# Patient Record
Sex: FEMALE | Race: OTHER | Employment: UNEMPLOYED | URBAN - METROPOLITAN AREA
[De-identification: names, ages, dates, MRNs, and addresses within clinical notes are randomized per-mention and may not be internally consistent; named-entity substitution may affect disease eponyms.]

---
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
## 2025-03-05 ENCOUNTER — APPOINTMENT (OUTPATIENT)
Facility: HOSPITAL | Age: 20
End: 2025-03-05
Payer: COMMERCIAL

## 2025-03-05 ENCOUNTER — HOSPITAL ENCOUNTER (EMERGENCY)
Facility: HOSPITAL | Age: 20
Discharge: HOME | End: 2025-03-05
Payer: COMMERCIAL

## 2025-03-05 VITALS
TEMPERATURE: 100.2 F | DIASTOLIC BLOOD PRESSURE: 61 MMHG | OXYGEN SATURATION: 98 % | RESPIRATION RATE: 16 BRPM | SYSTOLIC BLOOD PRESSURE: 101 MMHG | HEART RATE: 85 BPM

## 2025-03-05 DIAGNOSIS — J10.1 INFLUENZA A: Primary | ICD-10-CM

## 2025-03-05 LAB
ALBUMIN SERPL-MCNC: 3.5 G/DL (ref 3.4–5)
ALBUMIN/GLOB SERPL: 0.8 (ref 0.8–1.7)
ALP SERPL-CCNC: 66 U/L (ref 45–117)
ALT SERPL-CCNC: 20 U/L (ref 13–56)
ANION GAP SERPL CALC-SCNC: 5 MMOL/L (ref 3–18)
APPEARANCE UR: CLEAR
AST SERPL-CCNC: 48 U/L (ref 10–38)
BACTERIA URNS QL MICRO: ABNORMAL /HPF
BASOPHILS # BLD: 0.01 K/UL (ref 0–0.1)
BASOPHILS NFR BLD: 0.1 % (ref 0–2)
BILIRUB SERPL-MCNC: 0.5 MG/DL (ref 0.2–1)
BILIRUB UR QL: NEGATIVE
BUN SERPL-MCNC: 7 MG/DL (ref 7–18)
BUN/CREAT SERPL: 9 (ref 12–20)
CALCIUM SERPL-MCNC: 8.6 MG/DL (ref 8.5–10.1)
CHLORIDE SERPL-SCNC: 102 MMOL/L (ref 100–111)
CO2 SERPL-SCNC: 24 MMOL/L (ref 21–32)
COLOR UR: YELLOW
CREAT SERPL-MCNC: 0.8 MG/DL (ref 0.6–1.3)
DIFFERENTIAL METHOD BLD: ABNORMAL
EOSINOPHIL # BLD: 0.01 K/UL (ref 0–0.4)
EOSINOPHIL NFR BLD: 0.1 % (ref 0–5)
EPITH CASTS URNS QL MICRO: ABNORMAL /LPF (ref 0–5)
ERYTHROCYTE [DISTWIDTH] IN BLOOD BY AUTOMATED COUNT: 11.7 % (ref 11.6–14.5)
FLUAV RNA SPEC QL NAA+PROBE: DETECTED
FLUBV RNA SPEC QL NAA+PROBE: NOT DETECTED
GLOBULIN SER CALC-MCNC: 4.4 G/DL (ref 2–4)
GLUCOSE SERPL-MCNC: 115 MG/DL (ref 74–99)
GLUCOSE UR STRIP.AUTO-MCNC: NEGATIVE MG/DL
HCG SERPL QL: NEGATIVE
HCT VFR BLD AUTO: 41.1 % (ref 35–45)
HGB BLD-MCNC: 14 G/DL (ref 12–16)
HGB UR QL STRIP: ABNORMAL
IMM GRANULOCYTES # BLD AUTO: 0.02 K/UL (ref 0–0.04)
IMM GRANULOCYTES NFR BLD AUTO: 0.2 % (ref 0–0.5)
KETONES UR QL STRIP.AUTO: 40 MG/DL
LEUKOCYTE ESTERASE UR QL STRIP.AUTO: NEGATIVE
LIPASE SERPL-CCNC: 21 U/L (ref 13–75)
LYMPHOCYTES # BLD: 0.57 K/UL (ref 0.9–3.6)
LYMPHOCYTES NFR BLD: 6.5 % (ref 21–52)
MCH RBC QN AUTO: 29.4 PG (ref 24–34)
MCHC RBC AUTO-ENTMCNC: 34.1 G/DL (ref 31–37)
MCV RBC AUTO: 86.2 FL (ref 78–100)
MONOCYTES # BLD: 0.61 K/UL (ref 0.05–1.2)
MONOCYTES NFR BLD: 6.9 % (ref 3–10)
MUCOUS THREADS URNS QL MICRO: ABNORMAL /LPF
NEUTS SEG # BLD: 7.61 K/UL (ref 1.8–8)
NEUTS SEG NFR BLD: 86.2 % (ref 40–73)
NITRITE UR QL STRIP.AUTO: NEGATIVE
NRBC # BLD: 0 K/UL (ref 0–0.01)
NRBC BLD-RTO: 0 PER 100 WBC
PH UR STRIP: 6.5 (ref 5–8)
PLATELET # BLD AUTO: 205 K/UL (ref 135–420)
PMV BLD AUTO: 9.8 FL (ref 9.2–11.8)
POTASSIUM SERPL-SCNC: 5 MMOL/L (ref 3.5–5.5)
PROT SERPL-MCNC: 7.9 G/DL (ref 6.4–8.2)
PROT UR STRIP-MCNC: 100 MG/DL
RBC # BLD AUTO: 4.77 M/UL (ref 4.2–5.3)
RBC #/AREA URNS HPF: ABNORMAL /HPF (ref 0–5)
SARS-COV-2 RNA RESP QL NAA+PROBE: NOT DETECTED
SODIUM SERPL-SCNC: 131 MMOL/L (ref 136–145)
SOURCE: ABNORMAL
SP GR UR REFRACTOMETRY: >1.03 (ref 1–1.04)
UROBILINOGEN UR QL STRIP.AUTO: 2 EU/DL (ref 0.2–1)
WBC # BLD AUTO: 8.8 K/UL (ref 4.6–13.2)
WBC URNS QL MICRO: ABNORMAL /HPF (ref 0–5)

## 2025-03-05 PROCEDURE — 71046 X-RAY EXAM CHEST 2 VIEWS: CPT

## 2025-03-05 PROCEDURE — 80053 COMPREHEN METABOLIC PANEL: CPT

## 2025-03-05 PROCEDURE — 99284 EMERGENCY DEPT VISIT MOD MDM: CPT

## 2025-03-05 PROCEDURE — 85025 COMPLETE CBC W/AUTO DIFF WBC: CPT

## 2025-03-05 PROCEDURE — 81001 URINALYSIS AUTO W/SCOPE: CPT

## 2025-03-05 PROCEDURE — 84703 CHORIONIC GONADOTROPIN ASSAY: CPT

## 2025-03-05 PROCEDURE — 83690 ASSAY OF LIPASE: CPT

## 2025-03-05 PROCEDURE — 6370000000 HC RX 637 (ALT 250 FOR IP): Performed by: PHYSICIAN ASSISTANT

## 2025-03-05 PROCEDURE — 87636 SARSCOV2 & INF A&B AMP PRB: CPT

## 2025-03-05 RX ORDER — ONDANSETRON 4 MG/1
4 TABLET, FILM COATED ORAL EVERY 8 HOURS PRN
Qty: 10 TABLET | Refills: 0 | Status: SHIPPED | OUTPATIENT
Start: 2025-03-05 | End: 2025-03-07

## 2025-03-05 RX ORDER — ACETAMINOPHEN 500 MG
1000 TABLET ORAL
Status: COMPLETED | OUTPATIENT
Start: 2025-03-05 | End: 2025-03-05

## 2025-03-05 RX ADMIN — ACETAMINOPHEN 1000 MG: 500 TABLET, FILM COATED ORAL at 16:43

## 2025-03-05 NOTE — ED PROVIDER NOTES
EMERGENCY DEPARTMENT HISTORY AND PHYSICAL EXAM      Date: 3/5/2025  Patient Name: Tess Patricia    History of Presenting Illness     Chief Complaint   Patient presents with    Cold Symptoms       History (Context): Tess Patricia is a 19 y.o. female with no significant PMHx presents ambulatory to the ED today.  Patient reports productive cough, myalgias and nonbloody vomiting began 2 days ago.  Denies known exposure to COVID or the flu.  Denies concern for pregnancy.  Denies previous abdominal surgeries.  Denies diarrhea.  Patient has not take anything for symptoms      PCP: None, None    No current facility-administered medications for this encounter.     Current Outpatient Medications   Medication Sig Dispense Refill    ondansetron (ZOFRAN-ODT) 4 MG disintegrating tablet Take 1 tablet by mouth 3 times daily as needed for Nausea or Vomiting 21 tablet 0    triamcinolone (KENALOG) 0.1 % cream Apply topically 2 times daily. 80 g 0       Past History     Past Medical History:   No past medical history on file.    Past Surgical History:  No past surgical history on file.    Family History:  No family history on file.    Social History:        Allergies:  Allergies   Allergen Reactions    Nuts [Peanut-Containing Drug Products]          Physical Exam     Vitals:    03/05/25 1615 03/05/25 1807   BP: 115/74 101/61   Pulse: (!) 111 85   Resp: 14 16   Temp: (!) 101.3 °F (38.5 °C) 100.2 °F (37.9 °C)   TempSrc:  Oral   SpO2: 99% 98%       Physical Exam  Vitals and nursing note reviewed.   Constitutional:       Appearance: Normal appearance.      Comments: Pt in NAD   HENT:      Head: Normocephalic and atraumatic.   Eyes:      General: No scleral icterus.  Cardiovascular:      Rate and Rhythm: Regular rhythm. Tachycardia present.      Pulses: No decreased pulses.   Pulmonary:      Effort: Pulmonary effort is normal.      Breath sounds: Normal breath sounds and air entry.      Comments: Lungs CTA  Not working to

## 2025-03-07 ENCOUNTER — HOSPITAL ENCOUNTER (EMERGENCY)
Facility: HOSPITAL | Age: 20
Discharge: HOME | End: 2025-03-07
Attending: EMERGENCY MEDICINE
Payer: COMMERCIAL

## 2025-03-07 VITALS
HEART RATE: 84 BPM | TEMPERATURE: 98.4 F | OXYGEN SATURATION: 98 % | SYSTOLIC BLOOD PRESSURE: 109 MMHG | WEIGHT: 117 LBS | BODY MASS INDEX: 19.97 KG/M2 | RESPIRATION RATE: 20 BRPM | DIASTOLIC BLOOD PRESSURE: 73 MMHG | HEIGHT: 64 IN

## 2025-03-07 DIAGNOSIS — R10.13 ABDOMINAL PAIN, EPIGASTRIC: Primary | ICD-10-CM

## 2025-03-07 LAB
ALBUMIN SERPL-MCNC: 3.7 G/DL (ref 3.4–5)
ALBUMIN/GLOB SERPL: 0.9 (ref 0.8–1.7)
ALP SERPL-CCNC: 70 U/L (ref 45–117)
ALT SERPL-CCNC: 18 U/L (ref 13–56)
ANION GAP SERPL CALC-SCNC: 8 MMOL/L (ref 3–18)
APPEARANCE UR: CLEAR
AST SERPL-CCNC: 21 U/L (ref 10–38)
BACTERIA URNS QL MICRO: ABNORMAL /HPF
BASOPHILS # BLD: 0.02 K/UL (ref 0–0.1)
BASOPHILS NFR BLD: 0.2 % (ref 0–2)
BILIRUB SERPL-MCNC: 0.7 MG/DL (ref 0.2–1)
BILIRUB UR QL: NEGATIVE
BUN SERPL-MCNC: 9 MG/DL (ref 7–18)
BUN/CREAT SERPL: 13 (ref 12–20)
CALCIUM SERPL-MCNC: 9.1 MG/DL (ref 8.5–10.1)
CHLORIDE SERPL-SCNC: 100 MMOL/L (ref 100–111)
CO2 SERPL-SCNC: 25 MMOL/L (ref 21–32)
COLOR UR: ABNORMAL
CREAT SERPL-MCNC: 0.69 MG/DL (ref 0.6–1.3)
DIFFERENTIAL METHOD BLD: ABNORMAL
EKG ATRIAL RATE: 91 BPM
EKG DIAGNOSIS: NORMAL
EKG P AXIS: 78 DEGREES
EKG P-R INTERVAL: 106 MS
EKG Q-T INTERVAL: 350 MS
EKG QRS DURATION: 76 MS
EKG QTC CALCULATION (BAZETT): 430 MS
EKG R AXIS: 82 DEGREES
EKG T AXIS: 57 DEGREES
EKG VENTRICULAR RATE: 91 BPM
EOSINOPHIL # BLD: 0.02 K/UL (ref 0–0.4)
EOSINOPHIL NFR BLD: 0.2 % (ref 0–5)
EPITH CASTS URNS QL MICRO: NEGATIVE /LPF (ref 0–5)
ERYTHROCYTE [DISTWIDTH] IN BLOOD BY AUTOMATED COUNT: 11.8 % (ref 11.6–14.5)
GLOBULIN SER CALC-MCNC: 4 G/DL (ref 2–4)
GLUCOSE SERPL-MCNC: 96 MG/DL (ref 74–99)
GLUCOSE UR STRIP.AUTO-MCNC: NEGATIVE MG/DL
HCG SERPL-ACNC: <1 MIU/ML (ref 0–10)
HCG UR QL: NEGATIVE
HCT VFR BLD AUTO: 42.9 % (ref 35–45)
HGB BLD-MCNC: 15 G/DL (ref 12–16)
HGB UR QL STRIP: ABNORMAL
IMM GRANULOCYTES # BLD AUTO: 0.05 K/UL (ref 0–0.04)
IMM GRANULOCYTES NFR BLD AUTO: 0.5 % (ref 0–0.5)
KETONES UR QL STRIP.AUTO: 80 MG/DL
LEUKOCYTE ESTERASE UR QL STRIP.AUTO: NEGATIVE
LIPASE SERPL-CCNC: 26 U/L (ref 13–75)
LYMPHOCYTES # BLD: 1.56 K/UL (ref 0.9–3.6)
LYMPHOCYTES NFR BLD: 16.5 % (ref 21–52)
MCH RBC QN AUTO: 29.8 PG (ref 24–34)
MCHC RBC AUTO-ENTMCNC: 35 G/DL (ref 31–37)
MCV RBC AUTO: 85.1 FL (ref 78–100)
MONOCYTES # BLD: 0.73 K/UL (ref 0.05–1.2)
MONOCYTES NFR BLD: 7.7 % (ref 3–10)
MUCOUS THREADS URNS QL MICRO: ABNORMAL /LPF
NEUTS SEG # BLD: 7.07 K/UL (ref 1.8–8)
NEUTS SEG NFR BLD: 74.9 % (ref 40–73)
NITRITE UR QL STRIP.AUTO: NEGATIVE
NRBC # BLD: 0 K/UL (ref 0–0.01)
NRBC BLD-RTO: 0 PER 100 WBC
PH UR STRIP: 5.5 (ref 5–8)
PLATELET # BLD AUTO: 211 K/UL (ref 135–420)
PMV BLD AUTO: 9.7 FL (ref 9.2–11.8)
POTASSIUM SERPL-SCNC: 4 MMOL/L (ref 3.5–5.5)
PROT SERPL-MCNC: 7.7 G/DL (ref 6.4–8.2)
PROT UR STRIP-MCNC: 100 MG/DL
RBC # BLD AUTO: 5.04 M/UL (ref 4.2–5.3)
RBC #/AREA URNS HPF: ABNORMAL /HPF (ref 0–5)
SODIUM SERPL-SCNC: 133 MMOL/L (ref 136–145)
SP GR UR REFRACTOMETRY: >1.03 (ref 1–1.04)
TROPONIN I SERPL HS-MCNC: <3 NG/L (ref 0–54)
UROBILINOGEN UR QL STRIP.AUTO: 1 EU/DL (ref 0.2–1)
WBC # BLD AUTO: 9.5 K/UL (ref 4.6–13.2)
WBC URNS QL MICRO: ABNORMAL /HPF (ref 0–5)

## 2025-03-07 PROCEDURE — 81001 URINALYSIS AUTO W/SCOPE: CPT

## 2025-03-07 PROCEDURE — 85025 COMPLETE CBC W/AUTO DIFF WBC: CPT

## 2025-03-07 PROCEDURE — 84702 CHORIONIC GONADOTROPIN TEST: CPT

## 2025-03-07 PROCEDURE — 6360000002 HC RX W HCPCS: Performed by: EMERGENCY MEDICINE

## 2025-03-07 PROCEDURE — 96374 THER/PROPH/DIAG INJ IV PUSH: CPT

## 2025-03-07 PROCEDURE — 6370000000 HC RX 637 (ALT 250 FOR IP): Performed by: EMERGENCY MEDICINE

## 2025-03-07 PROCEDURE — 81025 URINE PREGNANCY TEST: CPT

## 2025-03-07 PROCEDURE — 2580000003 HC RX 258: Performed by: EMERGENCY MEDICINE

## 2025-03-07 PROCEDURE — 96375 TX/PRO/DX INJ NEW DRUG ADDON: CPT

## 2025-03-07 PROCEDURE — 93005 ELECTROCARDIOGRAM TRACING: CPT | Performed by: EMERGENCY MEDICINE

## 2025-03-07 PROCEDURE — 87086 URINE CULTURE/COLONY COUNT: CPT

## 2025-03-07 PROCEDURE — 83690 ASSAY OF LIPASE: CPT

## 2025-03-07 PROCEDURE — 99284 EMERGENCY DEPT VISIT MOD MDM: CPT

## 2025-03-07 PROCEDURE — 84484 ASSAY OF TROPONIN QUANT: CPT

## 2025-03-07 PROCEDURE — 93010 ELECTROCARDIOGRAM REPORT: CPT | Performed by: INTERNAL MEDICINE

## 2025-03-07 PROCEDURE — 80053 COMPREHEN METABOLIC PANEL: CPT

## 2025-03-07 PROCEDURE — 96361 HYDRATE IV INFUSION ADD-ON: CPT

## 2025-03-07 RX ORDER — 0.9 % SODIUM CHLORIDE 0.9 %
500 INTRAVENOUS SOLUTION INTRAVENOUS ONCE
Status: COMPLETED | OUTPATIENT
Start: 2025-03-07 | End: 2025-03-07

## 2025-03-07 RX ORDER — ONDANSETRON 4 MG/1
4 TABLET, ORALLY DISINTEGRATING ORAL 3 TIMES DAILY PRN
Qty: 21 TABLET | Refills: 0 | Status: SHIPPED | OUTPATIENT
Start: 2025-03-07

## 2025-03-07 RX ORDER — KETOROLAC TROMETHAMINE 15 MG/ML
30 INJECTION, SOLUTION INTRAMUSCULAR; INTRAVENOUS
Status: COMPLETED | OUTPATIENT
Start: 2025-03-07 | End: 2025-03-07

## 2025-03-07 RX ORDER — ONDANSETRON 2 MG/ML
4 INJECTION INTRAMUSCULAR; INTRAVENOUS
Status: COMPLETED | OUTPATIENT
Start: 2025-03-07 | End: 2025-03-07

## 2025-03-07 RX ADMIN — SODIUM CHLORIDE 500 ML: 9 INJECTION, SOLUTION INTRAVENOUS at 06:12

## 2025-03-07 RX ADMIN — LIDOCAINE HYDROCHLORIDE 40 ML: 20 SOLUTION ORAL at 05:53

## 2025-03-07 RX ADMIN — KETOROLAC TROMETHAMINE 30 MG: 15 INJECTION, SOLUTION INTRAMUSCULAR; INTRAVENOUS at 05:55

## 2025-03-07 RX ADMIN — ONDANSETRON 4 MG: 2 INJECTION, SOLUTION INTRAMUSCULAR; INTRAVENOUS at 05:54

## 2025-03-07 ASSESSMENT — LIFESTYLE VARIABLES
HOW OFTEN DO YOU HAVE A DRINK CONTAINING ALCOHOL: NEVER
HOW MANY STANDARD DRINKS CONTAINING ALCOHOL DO YOU HAVE ON A TYPICAL DAY: PATIENT DOES NOT DRINK

## 2025-03-07 ASSESSMENT — PAIN SCALES - GENERAL
PAINLEVEL_OUTOF10: 10
PAINLEVEL_OUTOF10: 8

## 2025-03-07 ASSESSMENT — PAIN DESCRIPTION - LOCATION
LOCATION: ABDOMEN
LOCATION: CHEST

## 2025-03-07 NOTE — ED PROVIDER NOTES
5:58 PM : Pt care assumed from Dr. Dickson  ,ED provider. History of patient complaint(s), available diagnostic reports and current treatment plan has been discussed thoroughly.   Bedside rounding on patient occured : Yes  Intended disposition of patient : likely d/c home.  Pending diagnostics reports and/or labs (please list): pending urine, fluids.    No results found for this or any previous visit (from the past 12 hour(s)).          ED Course as of 03/07/25 1758   Fri Mar 07, 2025   0524 NSR@91, normal axis,  [MI]   0559 Normal intervals.  [MI]   0605 Epigastric pain, pending urine and likely d/c. [LK]   0624 Reevaluated patient, she notes that she feels much better with the medications that were given.  She did vomit to the GI cocktail, reviewed EKG which appears nonischemic, labs are so far unremarkable.  Not pregnant, pending micro and likely will discharge home soon. [LK]      ED Course User Index  [LK] Titi Doan MD  [MI] Reji Dickson MD Kitchen, Levi K, MD  03/07/25 8909

## 2025-03-07 NOTE — ED PROVIDER NOTES
ED Course as of 03/07/25 0550   Fri Mar 07, 2025   0524 NSR@91, normal axis,  [MI]      ED Course User Index  [MI] Reji Dickson MD      Chief Complaint  Chest pains. My stomach hurt badly and I keep throwing up in my sleep.  History of Present Illness (HPI)  19-year-old female presents emergency department with abdominal pain which is been going on for the past 2 days patient has epigastric pain nausea a few episodes of vomiting no diarrhea. Patient recently was diagnosed with the flu. She denies pregnancy. Has no chest pain or shortness of breath no history of similar episode no other issues expressed no trauma.  Surgical History  Appendectomy at age 9.  Medical History  No chronic medical conditions reported.  Social History  Lives with mother. Smokes marijuana occasionally. Denies alcohol use. Not currently employed.  Family History  Has a twin. No other family history provided.  Allergies  None  Medications  Tylenol - reported as ineffective for current symptoms. No other medications reported.  Review of Systems (ROS)  Constitutional: Positive for fevers and chills. Gastrointestinal: Positive for nausea, vomiting, and epigastric pain. Respiratory: Denies shortness of breath. Cardiovascular: Reports chest pain. All other systems not specifically addressed.  Physical Examination  General: Patient appears uncomfortable. HEENT: Smoky membrane noted. Cardiovascular: Heart sounds normal. S1, S2 present. No murmurs, rubs, or gallops. Pulmonary: Lungs clear to auscultation bilaterally. Abdomen: Epigastric discomfort noted. Extremities: No clubbing, cyanosis, or edema. Vital signs not provided.  Diagnostic Tests  Planned diagnostic tests include: CBC, CMP, lipase, pregnancy test, urinalysis, EKG, troponin. Results pending.  Assessment and Plan  19-year-old female presenting with epigastric and chest pain, vomiting, fever, and chills. Differential diagnoses include:  Acute gastritis  Peptic ulcer  disease  Gastroesophageal reflux disease (GERD)  Acute cholecystitis  Pancreatitis  Atypical presentation of myocardial ischemia (less likely given age, but cannot be ruled out)  Plan:  Abdominal pain work-up as ordered (CBC, CMP, lipase, pregnancy test, urinalysis)  EKG and troponin to rule out cardiac causes  IV fluid hydration  Administer Ketorolac intravenously for pain management  Consider proton pump inhibitor therapy pending test results  Reassess after test results and response to initial treatment  Consider further imaging (abdominal ultrasound or CT) if diagnosis remains unclear after initial workup  Provide symptomatic relief and monitor for improvement  Follow up on flu diagnosis and consider alternative diagnoses if symptoms persist or worsen.     Reji Dickson MD  03/07/25 1663

## 2025-03-07 NOTE — ED TRIAGE NOTES
Pt arrived to ED with c/o CP, N/V, and abd pain. A&Ox4. Ambulatory. Pt diagnosed with Flu on 3/5/25.

## 2025-03-08 LAB
BACTERIA SPEC CULT: NORMAL
SERVICE CMNT-IMP: NORMAL